# Patient Record
Sex: MALE | Race: WHITE | NOT HISPANIC OR LATINO
[De-identification: names, ages, dates, MRNs, and addresses within clinical notes are randomized per-mention and may not be internally consistent; named-entity substitution may affect disease eponyms.]

---

## 2019-01-10 ENCOUNTER — APPOINTMENT (OUTPATIENT)
Dept: PLASTIC SURGERY | Facility: CLINIC | Age: 19
End: 2019-01-10
Payer: COMMERCIAL

## 2019-01-10 VITALS
DIASTOLIC BLOOD PRESSURE: 75 MMHG | HEART RATE: 67 BPM | HEIGHT: 74 IN | WEIGHT: 220 LBS | BODY MASS INDEX: 28.23 KG/M2 | TEMPERATURE: 98.4 F | RESPIRATION RATE: 20 BRPM | OXYGEN SATURATION: 96 % | SYSTOLIC BLOOD PRESSURE: 140 MMHG

## 2019-01-10 PROBLEM — Z00.00 ENCOUNTER FOR PREVENTIVE HEALTH EXAMINATION: Status: ACTIVE | Noted: 2019-01-10

## 2019-01-10 PROCEDURE — 99204 OFFICE O/P NEW MOD 45 MIN: CPT

## 2019-01-10 RX ORDER — IBUPROFEN 200 MG/1
200 TABLET, COATED ORAL
Refills: 0 | Status: ACTIVE | COMMUNITY
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
## 2019-01-10 NOTE — ASSESSMENT
[FreeTextEntry1] : displaced nasal fracture and septal injury with deviation to l airway with obstruction of airflow not previously experienced by pt.  rbal/s outlined  limitations of closed reduction explained pt will have general anesthetic and closed reduction monday as the window of opportunity is closing

## 2019-01-10 NOTE — PHYSICAL EXAM
[NI] : Normal [de-identified] : ecchymosis l suborbital area  [de-identified] : no septal hematoma  deviation with air flow obstruction 80% r side nasal swelling with dorsal irregularity

## 2019-01-14 ENCOUNTER — APPOINTMENT (OUTPATIENT)
Dept: PLASTIC SURGERY | Facility: HOSPITAL | Age: 19
End: 2019-01-14
Payer: COMMERCIAL

## 2019-01-14 PROCEDURE — 21337 CLOSED TX SEPTAL&NOSE FX: CPT

## 2019-01-14 PROCEDURE — 21320 CLSD TX NSL FX W/MNPJ&STABLJ: CPT | Mod: 59

## 2019-01-15 ENCOUNTER — MESSAGE (OUTPATIENT)
Age: 19
End: 2019-01-15

## 2019-01-22 ENCOUNTER — APPOINTMENT (OUTPATIENT)
Dept: PLASTIC SURGERY | Facility: CLINIC | Age: 19
End: 2019-01-22
Payer: COMMERCIAL

## 2019-01-22 DIAGNOSIS — S02.2XXA FRACTURE OF NASAL BONES, INITIAL ENCOUNTER FOR CLOSED FRACTURE: ICD-10-CM

## 2019-01-22 PROCEDURE — 99024 POSTOP FOLLOW-UP VISIT: CPT

## 2019-06-24 PROBLEM — S02.2XXA CLOSED FRACTURE OF NASAL BONE, INITIAL ENCOUNTER: Status: ACTIVE | Noted: 2019-01-10

## 2019-06-24 NOTE — HISTORY OF PRESENT ILLNESS
[FreeTextEntry1] : pt is happy and breathing well nose is straight and septum is a s well The patient denies fever,chills, shortness of breath, chest pain, calf pain uncomplicated recovery from surgery and anesthesia \par